# Patient Record
Sex: FEMALE | Race: WHITE | Employment: FULL TIME | URBAN - METROPOLITAN AREA
[De-identification: names, ages, dates, MRNs, and addresses within clinical notes are randomized per-mention and may not be internally consistent; named-entity substitution may affect disease eponyms.]

---

## 2024-10-07 ENCOUNTER — HOSPITAL ENCOUNTER (EMERGENCY)
Facility: HOSPITAL | Age: 54
Discharge: HOME | End: 2024-10-07
Attending: STUDENT IN AN ORGANIZED HEALTH CARE EDUCATION/TRAINING PROGRAM
Payer: COMMERCIAL

## 2024-10-07 VITALS
SYSTOLIC BLOOD PRESSURE: 159 MMHG | RESPIRATION RATE: 20 BRPM | DIASTOLIC BLOOD PRESSURE: 83 MMHG | TEMPERATURE: 98.4 F | HEIGHT: 60 IN | HEART RATE: 116 BPM | OXYGEN SATURATION: 99 % | BODY MASS INDEX: 29.17 KG/M2 | WEIGHT: 148.59 LBS

## 2024-10-07 DIAGNOSIS — T63.441A BEE STING, ACCIDENTAL OR UNINTENTIONAL, INITIAL ENCOUNTER: Primary | ICD-10-CM

## 2024-10-07 PROCEDURE — 96361 HYDRATE IV INFUSION ADD-ON: CPT

## 2024-10-07 PROCEDURE — 99284 EMERGENCY DEPT VISIT MOD MDM: CPT

## 2024-10-07 PROCEDURE — 2580000003 HC RX 258: Performed by: STUDENT IN AN ORGANIZED HEALTH CARE EDUCATION/TRAINING PROGRAM

## 2024-10-07 PROCEDURE — 96374 THER/PROPH/DIAG INJ IV PUSH: CPT

## 2024-10-07 PROCEDURE — 96375 TX/PRO/DX INJ NEW DRUG ADDON: CPT

## 2024-10-07 PROCEDURE — 6360000002 HC RX W HCPCS: Performed by: STUDENT IN AN ORGANIZED HEALTH CARE EDUCATION/TRAINING PROGRAM

## 2024-10-07 PROCEDURE — 2500000003 HC RX 250 WO HCPCS: Performed by: STUDENT IN AN ORGANIZED HEALTH CARE EDUCATION/TRAINING PROGRAM

## 2024-10-07 RX ORDER — DIPHENHYDRAMINE HYDROCHLORIDE 50 MG/ML
50 INJECTION INTRAMUSCULAR; INTRAVENOUS
Status: DISCONTINUED | OUTPATIENT
Start: 2024-10-07 | End: 2024-10-07

## 2024-10-07 RX ORDER — DIPHENHYDRAMINE HYDROCHLORIDE 50 MG/ML
25 INJECTION INTRAMUSCULAR; INTRAVENOUS
Status: COMPLETED | OUTPATIENT
Start: 2024-10-07 | End: 2024-10-07

## 2024-10-07 RX ORDER — 0.9 % SODIUM CHLORIDE 0.9 %
1000 INTRAVENOUS SOLUTION INTRAVENOUS ONCE
Status: COMPLETED | OUTPATIENT
Start: 2024-10-07 | End: 2024-10-07

## 2024-10-07 RX ORDER — EPINEPHRINE 0.3 MG/.3ML
0.3 INJECTION SUBCUTANEOUS ONCE
Qty: 0.3 ML | Refills: 1 | Status: SHIPPED | OUTPATIENT
Start: 2024-10-07 | End: 2024-10-07

## 2024-10-07 RX ADMIN — FAMOTIDINE 20 MG: 10 INJECTION, SOLUTION INTRAVENOUS at 13:41

## 2024-10-07 RX ADMIN — WATER 40 MG: 1 INJECTION INTRAMUSCULAR; INTRAVENOUS; SUBCUTANEOUS at 13:40

## 2024-10-07 RX ADMIN — SODIUM CHLORIDE 1000 ML: 9 INJECTION, SOLUTION INTRAVENOUS at 13:40

## 2024-10-07 RX ADMIN — DIPHENHYDRAMINE HYDROCHLORIDE 25 MG: 50 INJECTION INTRAMUSCULAR; INTRAVENOUS at 13:40

## 2024-10-07 NOTE — ED TRIAGE NOTES
TRIAGE NOTE:  Patient reports getting stung by a yellow jacket about 40 minutes ago.  She reports having severe allergic reaction in the past.    No hives noted, patient VSS.  Friend with patient.    Will give meds for reaction.  Patient instructed to flag down registration if breathing gets worse.

## 2024-10-07 NOTE — ED PROVIDER NOTES
range or not returned as of this dictation.          COURSE/REASSESSMENT     ED Course as of 10/07/24 1446   Mon Oct 07, 2024   1441 Re-evaluated. Completely asymptomatic. Will discharge [CC]      ED Course User Index  [CC] Kai Tran DO           CONSULTS:  None    PROCEDURES:  Unless otherwise noted below, none     Procedures      DIFFERENTIAL DIAGNOSIS/MDM:   Medical Decision Making  54-year-old female presents today after a bee sting.  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  On arrival she was having tingling in the hands and lips.  I felt her presentation was more consistent with anxiety and hyperventilation rather than true anaphylaxis.  I did go ahead and give her some steroids, Pepcid and Benadryl in addition IV fluids.  She did not have any objective signs of anaphylaxis such as vomiting, hives, wheezing or airway obstruction.  Her vital signs remained stable while in the emergency department.  She was reevaluated after treatment and in no acute distress and feeling better.  No indication for epinephrine while here in the ED.  She does typically carry an EpiPen with her but does not have one currently, requested a refill and I did provide this for her.  No indications for ongoing steroids.  Continue Benadryl for mild symptoms as needed.  No indication for lab work or imaging at this time.  Appropriate for discharge home at this time    Problems Addressed:  Bee sting, accidental or unintentional, initial encounter: acute illness or injury    Risk  Prescription drug management.          FINAL IMPRESSION      1. Bee sting, accidental or unintentional, initial encounter          DISPOSITION/PLAN   DISPOSITION Decision To Discharge 10/07/2024 02:41:54 PM          (Please note that portions of this note were completed with a voice recognition program.  Efforts were made to edit the dictations but occasionally words are mis-transcribed.)    Kai Tran DO

## 2024-10-07 NOTE — DISCHARGE INSTR - COC
Continuity of Care Form    Patient Name: Milena Vega   :  1970  MRN:  [id number]    Admit date:  10/7/2024  Discharge date:  ***    Code Status Order: No Order   Advance Directives:   Advance Care Flowsheet Documentation             Admitting Physician:  No admitting provider for patient encounter.  PCP: No primary care provider on file.    Discharging Nurse: ***  Discharging Hospital Unit/Room#: R36/R36  Discharging Unit Phone Number: ***    Emergency Contact:   No emergency contact information on file.    Past Surgical History:  No past surgical history on file.    Immunization History:     There is no immunization history on file for this patient.    Active Problems:  There is no problem list on file for this patient.      Isolation/Infection:   Isolation            No Isolation          Patient Infection Status       None to display            Nurse Assessment:  Last Vital Signs: BP (!) 159/83   Pulse (!) 116   Temp 98.4 °F (36.9 °C) (Oral)   Resp 20   Ht 1.524 m (5')   Wt 67.4 kg (148 lb 9.4 oz)   SpO2 99%   BMI 29.02 kg/m²     Last documented pain score (0-10 scale):    Last Weight:   Wt Readings from Last 1 Encounters:   10/07/24 67.4 kg (148 lb 9.4 oz)     Mental Status:  {IP PT MENTAL STATUS:14828}    IV Access:  { PAM IV ACCESS:263752944}    Nursing Mobility/ADLs:  Walking   {CHP DME ADLs:711489692}  Transfer  {CHP DME ADLs:484802136}  Bathing  {CHP DME ADLs:988375538}  Dressing  {CHP DME ADLs:389777826}  Toileting  {CHP DME ADLs:035712939}  Feeding  {CHP DME ADLs:268275318}  Med Admin  {CHP DME ADLs:380597897}  Med Delivery   { PAM MED Delivery:132587004}    Wound Care Documentation and Therapy:        Elimination:  Continence:   Bowel: {YES / NO:}  Bladder: {YES / NO:}  Urinary Catheter: {Urinary Catheter:317025030}   Colostomy/Ileostomy/Ileal Conduit: {YES / NO:}       Date of Last BM: ***  No intake or output data in the 24 hours ending 10/07/24 1444  No